# Patient Record
Sex: MALE | Race: WHITE | NOT HISPANIC OR LATINO
[De-identification: names, ages, dates, MRNs, and addresses within clinical notes are randomized per-mention and may not be internally consistent; named-entity substitution may affect disease eponyms.]

---

## 2018-09-12 ENCOUNTER — TRANSCRIPTION ENCOUNTER (OUTPATIENT)
Age: 78
End: 2018-09-12

## 2018-09-13 ENCOUNTER — OUTPATIENT (OUTPATIENT)
Dept: OUTPATIENT SERVICES | Facility: HOSPITAL | Age: 78
LOS: 1 days | End: 2018-09-13
Payer: MEDICARE

## 2018-09-13 ENCOUNTER — RESULT REVIEW (OUTPATIENT)
Age: 78
End: 2018-09-13

## 2018-09-13 DIAGNOSIS — K21.0 GASTRO-ESOPHAGEAL REFLUX DISEASE WITH ESOPHAGITIS: ICD-10-CM

## 2018-09-13 DIAGNOSIS — Z12.11 ENCOUNTER FOR SCREENING FOR MALIGNANT NEOPLASM OF COLON: ICD-10-CM

## 2018-09-13 PROCEDURE — 88313 SPECIAL STAINS GROUP 2: CPT

## 2018-09-13 PROCEDURE — 88312 SPECIAL STAINS GROUP 1: CPT | Mod: 26

## 2018-09-13 PROCEDURE — 88312 SPECIAL STAINS GROUP 1: CPT

## 2018-09-13 PROCEDURE — G0121: CPT

## 2018-09-13 PROCEDURE — 88305 TISSUE EXAM BY PATHOLOGIST: CPT

## 2018-09-13 PROCEDURE — 43239 EGD BIOPSY SINGLE/MULTIPLE: CPT

## 2018-09-13 PROCEDURE — 88313 SPECIAL STAINS GROUP 2: CPT | Mod: 26

## 2018-09-13 PROCEDURE — 88305 TISSUE EXAM BY PATHOLOGIST: CPT | Mod: 26

## 2018-09-13 PROCEDURE — 82962 GLUCOSE BLOOD TEST: CPT

## 2018-09-14 LAB — SURGICAL PATHOLOGY FINAL REPORT - CH: SIGNIFICANT CHANGE UP

## 2022-09-01 ENCOUNTER — APPOINTMENT (OUTPATIENT)
Dept: ORTHOPEDIC SURGERY | Facility: CLINIC | Age: 82
End: 2022-09-01

## 2022-09-01 VITALS — HEIGHT: 66 IN | WEIGHT: 148 LBS | BODY MASS INDEX: 23.78 KG/M2

## 2022-09-01 DIAGNOSIS — Z78.9 OTHER SPECIFIED HEALTH STATUS: ICD-10-CM

## 2022-09-01 DIAGNOSIS — I10 ESSENTIAL (PRIMARY) HYPERTENSION: ICD-10-CM

## 2022-09-01 DIAGNOSIS — S82.034A NONDISPLACED TRANSVERSE FRACTURE OF RIGHT PATELLA, INITIAL ENCOUNTER FOR CLOSED FRACTURE: ICD-10-CM

## 2022-09-01 DIAGNOSIS — E78.00 PURE HYPERCHOLESTEROLEMIA, UNSPECIFIED: ICD-10-CM

## 2022-09-01 DIAGNOSIS — E11.9 TYPE 2 DIABETES MELLITUS W/OUT COMPLICATIONS: ICD-10-CM

## 2022-09-01 PROCEDURE — 99214 OFFICE O/P EST MOD 30 MIN: CPT | Mod: 57

## 2022-09-01 PROCEDURE — 73564 X-RAY EXAM KNEE 4 OR MORE: CPT | Mod: RT

## 2022-09-01 RX ORDER — ASPIRIN 325 MG/1
325 TABLET, FILM COATED ORAL
Refills: 0 | Status: ACTIVE | COMMUNITY

## 2022-09-01 RX ORDER — ROSUVASTATIN CALCIUM 20 MG/1
20 TABLET, FILM COATED ORAL
Refills: 0 | Status: ACTIVE | COMMUNITY

## 2022-09-01 RX ORDER — METOPROLOL TARTRATE 100 MG/1
100 TABLET, FILM COATED ORAL
Refills: 0 | Status: ACTIVE | COMMUNITY

## 2022-09-01 RX ORDER — DULAGLUTIDE 0.75 MG/.5ML
0.75 INJECTION, SOLUTION SUBCUTANEOUS
Refills: 0 | Status: ACTIVE | COMMUNITY

## 2022-09-01 RX ORDER — OMEPRAZOLE 20 MG/1
20 CAPSULE, DELAYED RELEASE ORAL
Refills: 0 | Status: ACTIVE | COMMUNITY

## 2022-09-01 RX ORDER — FLUOXETINE HYDROCHLORIDE 40 MG/1
40 CAPSULE ORAL
Refills: 0 | Status: ACTIVE | COMMUNITY

## 2022-09-01 RX ORDER — ALLOPURINOL 300 MG/1
300 TABLET ORAL
Refills: 0 | Status: ACTIVE | COMMUNITY

## 2022-09-01 RX ORDER — RAMIPRIL 2.5 MG/1
2.5 CAPSULE ORAL
Refills: 0 | Status: ACTIVE | COMMUNITY

## 2022-09-01 RX ORDER — METFORMIN HYDROCHLORIDE 1000 MG/1
1000 TABLET, COATED ORAL
Refills: 0 | Status: ACTIVE | COMMUNITY

## 2022-09-01 RX ORDER — DAPAGLIFLOZIN 5 MG/1
5 TABLET, FILM COATED ORAL
Refills: 0 | Status: ACTIVE | COMMUNITY

## 2022-09-01 NOTE — PHYSICAL EXAM
[Orientated] : orientated [Able to Communicate] : able to communicate [Normal Skin] : normal skin [Right] : right knee [] : no erythema [FreeTextEntry3] : abrasion anterior knee.

## 2022-09-01 NOTE — HISTORY OF PRESENT ILLNESS
[Sudden] : sudden [5] : 5 [0] : 0 [Dull/Aching] : dull/aching [Localized] : localized [Intermittent] : intermittent [Leisure] : leisure [Rest] : rest [] : Post Surgical Visit: no [FreeTextEntry1] : right knee [FreeTextEntry3] : 8/31/22 [FreeTextEntry5] : Patient tripped and fell on his knee. [de-identified] : activity

## 2022-09-01 NOTE — DISCUSSION/SUMMARY
[de-identified] : The documentation recorded by the scribe accurately reflects the service I personally performed and the decisions made by me.\par I, Alanna Naylor, attest that this documentation has been prepared under the direction and in the presence of Provider Alcides Colin MD.\par \par The patient was seen by Alcides Colin MD\par \par The following radiographs were ordered and read by me during this patient's visit. I reviewed each radiograph in detail with the patient and parent, and discussed the findings as highlighted below.\par

## 2022-09-01 NOTE — ASSESSMENT
[FreeTextEntry1] : Underlying pathology reviewed and treatment options discussed. \par 09/01/2022 : xrays right knee 4 views reveal non displaced fracture of the lateral aspect of the patella. \par Activity modifier as tolerated.\par Surgical intervention not indicated. \par Ambulating with cane discussed with patient and spouse. \par Provided and dispensed HKB in office. \par 2 cane prescriptions offered, 4 prong vs one. \par Questions addressed with spouse present. \par RTO 2 wks repeat xrays. \par Driving not advised.

## 2022-09-15 ENCOUNTER — APPOINTMENT (OUTPATIENT)
Dept: ORTHOPEDIC SURGERY | Facility: CLINIC | Age: 82
End: 2022-09-15

## 2022-09-15 VITALS — BODY MASS INDEX: 23.78 KG/M2 | WEIGHT: 148 LBS | HEIGHT: 66 IN

## 2022-09-15 PROCEDURE — 73564 X-RAY EXAM KNEE 4 OR MORE: CPT | Mod: RT

## 2022-09-15 PROCEDURE — 99024 POSTOP FOLLOW-UP VISIT: CPT

## 2022-09-15 NOTE — ASSESSMENT
[FreeTextEntry1] : Underlying pathology reviewed and treatment options discussed. \par 09/01/2022 : xrays right knee 4 views reveal non displaced fracture of the lateral aspect of the patella. \par Activity modifier as tolerated.\par Surgical intervention not indicated. \par Ambulating with cane discussed with patient and spouse. \par Provided and dispensed HKB in office. \par 2 cane prescriptions offered, 4 prong vs one. \par Questions addressed with spouse present. \par RTO 2 wks repeat xrays. \par Driving not advised. \par \par 09/15/2022 xrays right knee unchanged no change in alignment pathly.\par Activity modifier as tolerated.\par RTO 3 wks for repeat xrays, and pt.\par Questions addressed.\par

## 2022-09-15 NOTE — DISCUSSION/SUMMARY
[de-identified] : The documentation recorded by the scribe accurately reflects the service I personally performed and the decisions made by me.\par I, Alanna Naylor, attest that this documentation has been prepared under the direction and in the presence of Provider Alcides Colin MD.\par \par The patient was seen by Alcides Colin MD.\par .xray\par The following radiographs were ordered and read by me during this patient's visit. I reviewed each radiograph in detail with the patient and parent, and discussed the findings as highlighted below.\par

## 2022-09-15 NOTE — PHYSICAL EXAM
[Orientated] : orientated [Able to Communicate] : able to communicate [Normal Skin] : normal skin [Right] : right knee [] : patient ambulates without assistive device [FreeTextEntry3] : abrasion anterior knee.

## 2022-10-06 ENCOUNTER — APPOINTMENT (OUTPATIENT)
Dept: ORTHOPEDIC SURGERY | Facility: CLINIC | Age: 82
End: 2022-10-06

## 2022-10-06 DIAGNOSIS — S82.034D NONDISPLACED TRANSVERSE FRACTURE OF RIGHT PATELLA, SUBSEQUENT ENCOUNTER FOR CLOSED FRACTURE WITH ROUTINE HEALING: ICD-10-CM

## 2022-10-06 DIAGNOSIS — S80.01XD CONTUSION OF RIGHT KNEE, SUBSEQUENT ENCOUNTER: ICD-10-CM

## 2022-10-06 PROCEDURE — 73564 X-RAY EXAM KNEE 4 OR MORE: CPT | Mod: RT

## 2022-10-06 PROCEDURE — 99024 POSTOP FOLLOW-UP VISIT: CPT

## 2022-10-06 NOTE — ASSESSMENT
[FreeTextEntry1] : Underlying pathology reviewed and treatment options discussed. \par 09/01/2022 : xrays right knee 4 views reveal non displaced fracture of the lateral aspect of the patella. \par Ambulating with cane discussed with patient and spouse. \par Provided and dispensed HKB in office. \par \par 09/15/2022 xrays right knee unchanged no change in alignment pathly.\par RTO 3 wks for repeat xrays, and pt.\par \par \par 10/6/22: Xray right patella with callus formation. Denies pain, and good mobility.\par Patient reports he is aware he hurt the knee. \par Start physical therapy to improve mechanics and reduce pain.\par Questions answered.\par Apply ice to affected area.\par RTO 4-6 wks\par

## 2022-10-06 NOTE — REASON FOR VISIT
[FreeTextEntry2] : fx care patella, denies pain, but reports he is aware something happened to his knee. Reports trouble getting up from downed position.

## 2022-10-06 NOTE — DISCUSSION/SUMMARY
[de-identified] : The documentation recorded by the scribe accurately reflects the service I personally performed and the decisions made by me.\par I, Alanna Naylor, attest that this documentation has been prepared under the direction and in the presence of Provider Alcides Colin MD.\par The patient was seen by Dr. Colin\par